# Patient Record
Sex: FEMALE | Race: WHITE | NOT HISPANIC OR LATINO | URBAN - METROPOLITAN AREA
[De-identification: names, ages, dates, MRNs, and addresses within clinical notes are randomized per-mention and may not be internally consistent; named-entity substitution may affect disease eponyms.]

---

## 2025-02-19 ENCOUNTER — OFFICE VISIT (OUTPATIENT)
Dept: URGENT CARE | Age: 60
End: 2025-02-19
Payer: MEDICARE

## 2025-02-19 VITALS
WEIGHT: 170 LBS | OXYGEN SATURATION: 95 % | SYSTOLIC BLOOD PRESSURE: 137 MMHG | TEMPERATURE: 99.1 F | RESPIRATION RATE: 18 BRPM | HEIGHT: 62 IN | BODY MASS INDEX: 31.28 KG/M2 | HEART RATE: 105 BPM | DIASTOLIC BLOOD PRESSURE: 80 MMHG

## 2025-02-19 DIAGNOSIS — J10.1 INFLUENZA A: Primary | ICD-10-CM

## 2025-02-19 DIAGNOSIS — R68.89 FLU-LIKE SYMPTOMS: ICD-10-CM

## 2025-02-19 LAB
POC RAPID INFLUENZA A: POSITIVE
POC RAPID INFLUENZA B: NEGATIVE
POC SARS-COV-2 AG BINAX: NORMAL

## 2025-02-19 RX ORDER — ALBUTEROL SULFATE 90 UG/1
2 INHALANT RESPIRATORY (INHALATION) EVERY 4 HOURS PRN
COMMUNITY
Start: 2022-08-19

## 2025-02-19 RX ORDER — PROMETHAZINE HYDROCHLORIDE AND DEXTROMETHORPHAN HYDROBROMIDE 6.25; 15 MG/5ML; MG/5ML
5 SYRUP ORAL 4 TIMES DAILY PRN
Qty: 120 ML | Refills: 0 | Status: SHIPPED | OUTPATIENT
Start: 2025-02-19 | End: 2025-02-26

## 2025-02-19 RX ORDER — ALBUTEROL SULFATE 90 UG/1
2 INHALANT RESPIRATORY (INHALATION) EVERY 6 HOURS PRN
Qty: 18 G | Refills: 0 | Status: SHIPPED | OUTPATIENT
Start: 2025-02-19 | End: 2026-02-19

## 2025-02-19 RX ORDER — FLUTICASONE PROPIONATE 50 MCG
2 SPRAY, SUSPENSION (ML) NASAL DAILY
COMMUNITY
Start: 2022-11-11

## 2025-02-19 ASSESSMENT — ENCOUNTER SYMPTOMS
EYES NEGATIVE: 1
FEVER: 1
HEADACHES: 1
HEMATOLOGIC/LYMPHATIC NEGATIVE: 1
ALLERGIC/IMMUNOLOGIC NEGATIVE: 1
SORE THROAT: 0
SHORTNESS OF BREATH: 1
COUGH: 1
MYALGIAS: 1
CARDIOVASCULAR NEGATIVE: 1
PSYCHIATRIC NEGATIVE: 1
ENDOCRINE NEGATIVE: 1
GASTROINTESTINAL NEGATIVE: 1

## 2025-02-19 NOTE — PATIENT INSTRUCTIONS
Motrin or tylenol as directed for pain or fever  Increase clear fluids  Pcp follow up this week if not improving or worsening

## 2025-02-19 NOTE — PROGRESS NOTES
"Subjective   Patient ID: Morenita Lr is a 59 y.o. female. They present today with a chief complaint of Cough and Nasal Congestion (Pt c/o cough, chest congestion, chills x 5 days.).    History of Present Illness    History provided by:  Patient   used: No    Cough  Associated symptoms include a fever, headaches, myalgias and shortness of breath. Pertinent negatives include no ear pain or sore throat.   This is a 59 yr old female here for respiratory sxs. Dry cough, dyspnea, URI sxs, HA, myalgias, and feeling feverish x 5 days. No sore throat or ear pain. No hx of asthma or COPD. Pt is a smoker.    Past Medical History  Allergies as of 02/19/2025 - Reviewed 02/19/2025   Allergen Reaction Noted    Penicillins Unknown 02/19/2025       (Not in a hospital admission)       Past Medical History:   Diagnosis Date    Other conditions influencing health status     No significant past medical history       Past Surgical History:   Procedure Laterality Date    OTHER SURGICAL HISTORY  07/28/2020    Knee surgery        reports that she has been smoking cigarettes. She has never used smokeless tobacco.    Review of Systems  Review of Systems   Constitutional:  Positive for fever.   HENT:  Positive for congestion. Negative for ear pain and sore throat.    Eyes: Negative.    Respiratory:  Positive for cough and shortness of breath.    Cardiovascular: Negative.    Gastrointestinal: Negative.    Endocrine: Negative.    Genitourinary: Negative.    Musculoskeletal:  Positive for myalgias.   Skin: Negative.    Allergic/Immunologic: Negative.    Neurological:  Positive for headaches.   Hematological: Negative.    Psychiatric/Behavioral: Negative.     All other systems reviewed and are negative.    Objective    Vitals:    02/19/25 1716   BP: 137/80   Pulse: 105   Resp: 18   Temp: 37.3 °C (99.1 °F)   SpO2: 95%   Weight: 77.1 kg (170 lb)   Height: 1.575 m (5' 2\")     No LMP recorded.    Physical Exam  Vitals and nursing " note reviewed.   Constitutional:       Appearance: Normal appearance.   HENT:      Head: Normocephalic and atraumatic.      Right Ear: Tympanic membrane and ear canal normal.      Left Ear: Tympanic membrane and ear canal normal.      Mouth/Throat:      Mouth: Mucous membranes are moist.      Pharynx: Oropharynx is clear.   Cardiovascular:      Rate and Rhythm: Normal rate and regular rhythm.   Pulmonary:      Effort: Pulmonary effort is normal.      Breath sounds: Normal breath sounds.   Musculoskeletal:      Cervical back: Neck supple.   Lymphadenopathy:      Cervical: No cervical adenopathy.   Skin:     General: Skin is warm and dry.   Neurological:      General: No focal deficit present.      Mental Status: She is alert and oriented to person, place, and time.   Psychiatric:         Mood and Affect: Mood normal.         Behavior: Behavior normal.         Procedures    Point of Care Test & Imaging Results from this visit  Results for orders placed or performed in visit on 02/19/25   POCT Influenza A/B manually resulted   Result Value Ref Range    POC Rapid Influenza A Positive (A) Negative    POC Rapid Influenza B Negative Negative   POCT Covid-19 Rapid Antigen   Result Value Ref Range    POC PARVEEN-COV-2 AG  Presumptive negative test for SARS-CoV-2 (no antigen detected)     Presumptive negative test for SARS-CoV-2 (no antigen detected)      No results found.    Diagnostic study results (if any) were reviewed by Soraida Colon PA-C.    Assessment/Plan   Allergies, medications, history, and pertinent labs/EKGs/Imaging reviewed by Soraida Colon PA-C.      Orders and Diagnoses  Diagnoses and all orders for this visit:  Influenza A  -     albuterol 90 mcg/actuation inhaler; Inhale 2 puffs every 6 hours if needed for wheezing.  -     promethazine-DM (Phenergan-DM) 6.25-15 mg/5 mL syrup; Take 5 mL by mouth 4 times a day as needed for cough for up to 7 days.  Flu-like symptoms  -     POCT Influenza A/B manually  resulted  -     POCT Covid-19 Rapid Antigen    Plan:  Med as above  Fluids and rest  Pcp follow up this week if not improving or worsening  ER visit anytime 24/7 for acute worsening or changing condition    Patient disposition: Home    Electronically signed by Soraida Colon PA-C  5:40 PM

## 2025-02-21 ENCOUNTER — APPOINTMENT (OUTPATIENT)
Dept: URGENT CARE | Age: 60
End: 2025-02-21
Payer: MEDICARE

## 2025-02-21 ENCOUNTER — OFFICE VISIT (OUTPATIENT)
Dept: URGENT CARE | Age: 60
End: 2025-02-21
Payer: MEDICARE

## 2025-02-21 ENCOUNTER — ANCILLARY PROCEDURE (OUTPATIENT)
Dept: URGENT CARE | Age: 60
End: 2025-02-21
Payer: MEDICARE

## 2025-02-21 VITALS
WEIGHT: 170 LBS | DIASTOLIC BLOOD PRESSURE: 69 MMHG | BODY MASS INDEX: 31.28 KG/M2 | SYSTOLIC BLOOD PRESSURE: 117 MMHG | RESPIRATION RATE: 17 BRPM | HEART RATE: 74 BPM | OXYGEN SATURATION: 96 % | HEIGHT: 62 IN | TEMPERATURE: 98.1 F

## 2025-02-21 DIAGNOSIS — J10.1 INFLUENZA A: ICD-10-CM

## 2025-02-21 DIAGNOSIS — R05.1 ACUTE COUGH: ICD-10-CM

## 2025-02-21 DIAGNOSIS — F17.210 TOBACCO DEPENDENCE DUE TO CIGARETTES: ICD-10-CM

## 2025-02-21 DIAGNOSIS — R05.1 ACUTE COUGH: Primary | ICD-10-CM

## 2025-02-21 PROCEDURE — 3008F BODY MASS INDEX DOCD: CPT | Performed by: FAMILY MEDICINE

## 2025-02-21 PROCEDURE — 71046 X-RAY EXAM CHEST 2 VIEWS: CPT | Performed by: FAMILY MEDICINE

## 2025-02-21 PROCEDURE — 99214 OFFICE O/P EST MOD 30 MIN: CPT | Performed by: FAMILY MEDICINE

## 2025-02-21 RX ORDER — BENZONATATE 200 MG/1
200 CAPSULE ORAL 3 TIMES DAILY PRN
Qty: 30 CAPSULE | Refills: 0 | Status: SHIPPED | OUTPATIENT
Start: 2025-02-21 | End: 2025-02-28

## 2025-02-21 ASSESSMENT — PATIENT HEALTH QUESTIONNAIRE - PHQ9
1. LITTLE INTEREST OR PLEASURE IN DOING THINGS: NOT AT ALL
SUM OF ALL RESPONSES TO PHQ9 QUESTIONS 1 AND 2: 0
2. FEELING DOWN, DEPRESSED OR HOPELESS: NOT AT ALL

## 2025-02-21 NOTE — PROGRESS NOTES
"Subjective   Patient ID: Morenita Lr is a 59 y.o. female. They present today with a chief complaint of Nasal Congestion (Nasal congestion/chest congestion getting worse since last visit, patient tested positive for Influenza A on 2/19/2025 and now has concerns for a sinus infection ), Cough, and Generalized Body Aches.    History of Present Illness  HPI  Several days of cough, congestion, postnasal drip runny nose.  Patient tested positive for influenza A 2 days ago.  She states that she continues to cough and have nasal congestion 2 days after the diagnosis.  No fevers have been noted. Patient denies shortness of breath, chest pain, or wheezing. No red eyes, eye pain, or discharge. They deny nausea vomiting or diarrhea. No known exposures to strep mono influenza, COVID-19 or pneumonia. No skin rashes are noted. Over-the-counter medications are offering minimal relief from symptoms.  Longtime smoker.      Past Medical History  Allergies as of 02/21/2025 - Reviewed 02/21/2025   Allergen Reaction Noted    Penicillins Unknown 02/19/2025       (Not in a hospital admission)       Past Medical History:   Diagnosis Date    Other conditions influencing health status     No significant past medical history       Past Surgical History:   Procedure Laterality Date    OTHER SURGICAL HISTORY  07/28/2020    Knee surgery        reports that she has been smoking cigarettes. She has never used smokeless tobacco.    Review of Systems  Review of Systems     As in history of present illness                          Objective    Vitals:    02/21/25 1140   BP: 117/69   BP Location: Left arm   Patient Position: Sitting   BP Cuff Size: Adult   Pulse: 74   Resp: 17   Temp: 36.7 °C (98.1 °F)   TempSrc: Oral   SpO2: 96%   Weight: 77.1 kg (170 lb)   Height: 1.575 m (5' 2\")     No LMP recorded.    Physical Exam  Gen.-alert cooperative and in no apparent distress  Head and face- no swelling redness, tenderness or rash  Eyes-EOMI, no redness or " discharge is noted  ENT- bilateral nasal congestion with clear rhinorrhea and postnasal drip in oral pharynx  Neck- normal range of motion with no lymphadenopathy or mass.   Pulmonary- no respiratory distress noted. Lungs clear to auscultation except bibasilar crackles but no wheezes.  Skin- no rashes discoloration or skin lesions noted  Lymphatic-- no lymph node swelling or tenderness appreciated  Procedures    Point of Care Test & Imaging Results from this visit  No results found for this visit on 02/21/25.   No results found.    Diagnostic study results (if any) were reviewed by Vu Mcmanus MD.    Assessment/Plan   Allergies, medications, history, and pertinent labs/EKGs/Imaging reviewed by Vu Mcmanus MD.     Medical Decision Making  At time of discharge patient was clinically well-appearing and HDS for outpatient management. The patient and/or family was educated regarding diagnosis, supportive care, OTC and Rx medications. The patient and/or family was given the opportunity to ask questions prior to discharge.  They verbalized understanding of my discussion of the plans for treatment, expected course, indications to return to  or seek further evaluation in ED, and the need for timely follow up as directed.   They were provided with a work/school excuse if requested.    Orders and Diagnoses  Diagnoses and all orders for this visit:  Acute cough  -     XR chest 2 views; Future  -     benzonatate (Tessalon) 200 mg capsule; Take 1 capsule (200 mg) by mouth 3 times a day as needed for cough for up to 7 days. Do not crush or chew.  Influenza A  -     XR chest 2 views; Future  Tobacco dependence due to cigarettes      Medical Admin Record      Patient disposition: Home    Electronically signed by Vu Mcmanus MD  12:06 PM

## 2025-02-21 NOTE — PATIENT INSTRUCTIONS
HOME CARE INSTRUCTIONS:   --- Mucinex D and Flonase nasal spray as discussed  --- May use prescribed cough medicine 3 times a day as needed for cough  --- Discontinue smoking  --- gargle with lightly salted water several times a day  --- May take over-the-counter Tylenol for pain and fever control  --- Plenty of rest and sleep  --- Drink lots of fluids  --- Cover  your mouth and nose when coughing  or sneezing  --- Wash your hands often    SEEK FURTHER MEDICAL ATTENTION OR GO THE EMERGENCY ROOM IF:  --- Fever persists more than 3 days, or elevated over 104°  --- You have difficulty breathing or cannot catch your breath  --- Vomiting: unable to keep liquids, food or medications on stomach  --- Symptoms do not improve after 5-7  --- You Become listless, or get a stiff neck    Advised the patient to seek immediate Emergency Medical attention if symptoms fail to improve, worsen or any concerning symptoms arise.

## 2025-07-23 ENCOUNTER — PATIENT OUTREACH (OUTPATIENT)
Dept: CARE COORDINATION | Facility: CLINIC | Age: 60
End: 2025-07-23

## 2025-07-23 DIAGNOSIS — Z12.31 ENCOUNTER FOR SCREENING MAMMOGRAM FOR BREAST CANCER: ICD-10-CM
